# Patient Record
Sex: FEMALE | Race: OTHER | Employment: UNEMPLOYED | URBAN - METROPOLITAN AREA
[De-identification: names, ages, dates, MRNs, and addresses within clinical notes are randomized per-mention and may not be internally consistent; named-entity substitution may affect disease eponyms.]

---
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
## 2021-03-01 ENCOUNTER — HOSPITAL ENCOUNTER (OUTPATIENT)
Age: 21
Discharge: HOME | End: 2021-03-01

## 2021-03-01 VITALS
HEIGHT: 66 IN | SYSTOLIC BLOOD PRESSURE: 101 MMHG | DIASTOLIC BLOOD PRESSURE: 61 MMHG | HEART RATE: 98 BPM | WEIGHT: 124 LBS | TEMPERATURE: 98 F | OXYGEN SATURATION: 100 % | RESPIRATION RATE: 14 BRPM | BODY MASS INDEX: 19.93 KG/M2

## 2021-03-01 DIAGNOSIS — J03.90 ACUTE TONSILLITIS, UNSPECIFIED ETIOLOGY: Primary | ICD-10-CM

## 2021-03-01 LAB
#MXD IC: 1.6 X10ˆ3/UL (ref 0.1–1)
B-HCG UR QL: NEGATIVE
CREAT BLD-MCNC: 0.5 MG/DL
GLUCOSE BLD-MCNC: 93 MG/DL (ref 70–99)
HCT VFR BLD AUTO: 37.9 %
HGB BLD-MCNC: 12.6 G/DL
ISTAT BUN: 7 MG/DL (ref 7–18)
ISTAT CHLORIDE: 101 MMOL/L (ref 98–112)
ISTAT HEMATOCRIT: 41 %
ISTAT IONIZED CALCIUM FOR CHEM 8: 1.2 MMOL/L (ref 1.12–1.32)
ISTAT POTASSIUM: 3.3 MMOL/L (ref 3.6–5.1)
ISTAT SODIUM: 138 MMOL/L (ref 136–145)
ISTAT TCO2: 27 MMOL/L (ref 21–32)
LYMPHOCYTES # BLD AUTO: 1.4 X10ˆ3/UL (ref 1–4)
LYMPHOCYTES NFR BLD AUTO: 11.5 %
MCH RBC QN AUTO: 27.5 PG (ref 26–34)
MCHC RBC AUTO-ENTMCNC: 33.2 G/DL (ref 31–37)
MCV RBC AUTO: 82.8 FL (ref 80–100)
MIXED CELL %: 13.6 %
NEUTROPHILS # BLD AUTO: 9.1 X10ˆ3/UL (ref 1.5–7.7)
NEUTROPHILS NFR BLD AUTO: 74.9 %
PLATELET # BLD AUTO: 283 X10ˆ3/UL (ref 150–450)
RBC # BLD AUTO: 4.58 X10ˆ6/UL
S PYO AG THROAT QL: NEGATIVE
WBC # BLD AUTO: 12.1 X10ˆ3/UL (ref 4–11)

## 2021-03-01 PROCEDURE — 96365 THER/PROPH/DIAG IV INF INIT: CPT | Performed by: NURSE PRACTITIONER

## 2021-03-01 PROCEDURE — 99213 OFFICE O/P EST LOW 20 MIN: CPT | Performed by: NURSE PRACTITIONER

## 2021-03-01 PROCEDURE — 85025 COMPLETE CBC W/AUTO DIFF WBC: CPT | Performed by: NURSE PRACTITIONER

## 2021-03-01 PROCEDURE — 87081 CULTURE SCREEN ONLY: CPT | Performed by: NURSE PRACTITIONER

## 2021-03-01 PROCEDURE — 96375 TX/PRO/DX INJ NEW DRUG ADDON: CPT | Performed by: NURSE PRACTITIONER

## 2021-03-01 PROCEDURE — 36415 COLL VENOUS BLD VENIPUNCTURE: CPT | Performed by: NURSE PRACTITIONER

## 2021-03-01 PROCEDURE — S0077 INJECTION, CLINDAMYCIN PHOSP: HCPCS | Performed by: NURSE PRACTITIONER

## 2021-03-01 PROCEDURE — 81025 URINE PREGNANCY TEST: CPT | Performed by: NURSE PRACTITIONER

## 2021-03-01 PROCEDURE — 87880 STREP A ASSAY W/OPTIC: CPT | Performed by: NURSE PRACTITIONER

## 2021-03-01 PROCEDURE — 80047 BASIC METABLC PNL IONIZED CA: CPT | Performed by: NURSE PRACTITIONER

## 2021-03-01 RX ORDER — CLINDAMYCIN PHOSPHATE 600 MG/50ML
600 INJECTION INTRAVENOUS ONCE
Status: COMPLETED | OUTPATIENT
Start: 2021-03-01 | End: 2021-03-01

## 2021-03-01 RX ORDER — DEXAMETHASONE SODIUM PHOSPHATE 10 MG/ML
10 INJECTION, SOLUTION INTRAMUSCULAR; INTRAVENOUS ONCE
Status: COMPLETED | OUTPATIENT
Start: 2021-03-01 | End: 2021-03-01

## 2021-03-01 RX ORDER — SODIUM CHLORIDE 9 MG/ML
1000 INJECTION, SOLUTION INTRAVENOUS ONCE
Status: COMPLETED | OUTPATIENT
Start: 2021-03-01 | End: 2021-03-01

## 2021-03-01 RX ORDER — KETOROLAC TROMETHAMINE 30 MG/ML
15 INJECTION, SOLUTION INTRAMUSCULAR; INTRAVENOUS ONCE
Status: COMPLETED | OUTPATIENT
Start: 2021-03-01 | End: 2021-03-01

## 2021-03-01 RX ORDER — CLINDAMYCIN HYDROCHLORIDE 300 MG/1
300 CAPSULE ORAL 3 TIMES DAILY
Qty: 30 CAPSULE | Refills: 0 | Status: SHIPPED | OUTPATIENT
Start: 2021-03-01 | End: 2021-03-11

## 2021-03-01 NOTE — ED PROVIDER NOTES
Patient Seen in: [facility] [place]      History   Patient presents with:  Sore Throat    Stated Complaint: strep 1/20/20 sorethroat again neg strep sore throat again    HPI/Subjective:   HPI    This is a 26-year-old female who presents with a chief (Room air)       Current:/61   Pulse 98   Temp 98.4 °F (36.9 °C) (Temporal)   Resp 14   Ht 167.6 cm (5' 6\")   Wt 56.2 kg   SpO2 100%   BMI 20.01 kg/m²         Physical Exam  Vitals signs and nursing note reviewed.    Constitutional:       General: Sh Thought content normal.         Judgment: Judgment normal.       ED Course     Labs Reviewed   POCT CBC - Abnormal; Notable for the following components:       Result Value    WBC IC 12.1 (*)     # Neutrophil 9.1 (*)     # Mixed Cells 1.6 (*)     All other her discharge paper work for her. We also discussed supportive care. Pt and mother verbalized plan of care and states understanding. Case also discussed with Dr. Kevin Anguiano who agrees with the plan of care.      MDM  Number of Diagnoses or Management Options

## 2021-03-01 NOTE — ED INITIAL ASSESSMENT (HPI)
Patient with sore throat since the end of January. Had a video visit on 1/20 and was prescribed antibiotics for a possible strep throat infection.   Patient felt better while on the antibiotics but her sore throat returned 2 days after completing the cours

## 2021-03-02 ENCOUNTER — OFFICE VISIT (OUTPATIENT)
Dept: OTOLARYNGOLOGY | Facility: CLINIC | Age: 21
End: 2021-03-02

## 2021-03-02 VITALS
DIASTOLIC BLOOD PRESSURE: 60 MMHG | HEIGHT: 66 IN | BODY MASS INDEX: 19.93 KG/M2 | SYSTOLIC BLOOD PRESSURE: 90 MMHG | TEMPERATURE: 98 F | WEIGHT: 124 LBS

## 2021-03-02 DIAGNOSIS — J36 PERITONSILLAR ABSCESS: Primary | ICD-10-CM

## 2021-03-02 PROCEDURE — 3074F SYST BP LT 130 MM HG: CPT | Performed by: OTOLARYNGOLOGY

## 2021-03-02 PROCEDURE — 99203 OFFICE O/P NEW LOW 30 MIN: CPT | Performed by: OTOLARYNGOLOGY

## 2021-03-02 PROCEDURE — 3078F DIAST BP <80 MM HG: CPT | Performed by: OTOLARYNGOLOGY

## 2021-03-02 PROCEDURE — 3008F BODY MASS INDEX DOCD: CPT | Performed by: OTOLARYNGOLOGY

## 2021-03-02 RX ORDER — PREDNISONE 20 MG/1
TABLET ORAL
Qty: 6 TABLET | Refills: 0 | Status: SHIPPED | OUTPATIENT
Start: 2021-03-02

## 2021-03-02 NOTE — PROGRESS NOTES
Velvet Martinez is a 21year old female. Patient presents with: Tonsil Problem: IC follow up- acute tonsillitis- currently taking antibiotic    HPI:   She has been experiencing pain in her throat especially in the left side for the last 4 to 5 weeks.   She Palpation - Normal. Parotid gland - Normal. Thyroid gland - Normal.   Psychiatric Normal Orientation - Oriented to time, place, person & situation. Appropriate mood and affect. Lymph Detail Normal Submental. Submandibular.  Anterior cervical. Posterior ce

## 2021-03-10 ENCOUNTER — TELEPHONE (OUTPATIENT)
Dept: OTOLARYNGOLOGY | Facility: CLINIC | Age: 21
End: 2021-03-10

## 2021-10-30 ENCOUNTER — APPOINTMENT (OUTPATIENT)
Dept: CT IMAGING | Facility: HOSPITAL | Age: 21
End: 2021-10-30
Attending: EMERGENCY MEDICINE

## 2021-10-30 ENCOUNTER — HOSPITAL ENCOUNTER (EMERGENCY)
Facility: HOSPITAL | Age: 21
Discharge: HOME | End: 2021-10-30
Attending: EMERGENCY MEDICINE

## 2021-10-30 ENCOUNTER — HOSPITAL ENCOUNTER (OUTPATIENT)
Age: 21
Discharge: ANOTHER HOSPITAL | End: 2021-10-30

## 2021-10-30 VITALS
WEIGHT: 140 LBS | HEART RATE: 98 BPM | HEIGHT: 66 IN | SYSTOLIC BLOOD PRESSURE: 109 MMHG | TEMPERATURE: 99 F | BODY MASS INDEX: 22.5 KG/M2 | OXYGEN SATURATION: 98 % | DIASTOLIC BLOOD PRESSURE: 65 MMHG | RESPIRATION RATE: 18 BRPM

## 2021-10-30 VITALS
RESPIRATION RATE: 16 BRPM | HEIGHT: 66 IN | OXYGEN SATURATION: 100 % | DIASTOLIC BLOOD PRESSURE: 76 MMHG | SYSTOLIC BLOOD PRESSURE: 134 MMHG | BODY MASS INDEX: 22.5 KG/M2 | WEIGHT: 140 LBS | TEMPERATURE: 100 F | HEART RATE: 112 BPM

## 2021-10-30 DIAGNOSIS — R10.11 ABDOMINAL PAIN, RIGHT UPPER QUADRANT: Primary | ICD-10-CM

## 2021-10-30 DIAGNOSIS — N30.01 ACUTE CYSTITIS WITH HEMATURIA: ICD-10-CM

## 2021-10-30 DIAGNOSIS — N12 PYELONEPHRITIS: Primary | ICD-10-CM

## 2021-10-30 PROCEDURE — 87086 URINE CULTURE/COLONY COUNT: CPT | Performed by: NURSE PRACTITIONER

## 2021-10-30 PROCEDURE — 85025 COMPLETE CBC W/AUTO DIFF WBC: CPT | Performed by: EMERGENCY MEDICINE

## 2021-10-30 PROCEDURE — 99284 EMERGENCY DEPT VISIT MOD MDM: CPT

## 2021-10-30 PROCEDURE — 80048 BASIC METABOLIC PNL TOTAL CA: CPT | Performed by: EMERGENCY MEDICINE

## 2021-10-30 PROCEDURE — 80048 BASIC METABOLIC PNL TOTAL CA: CPT

## 2021-10-30 PROCEDURE — 99215 OFFICE O/P EST HI 40 MIN: CPT | Performed by: NURSE PRACTITIONER

## 2021-10-30 PROCEDURE — 96361 HYDRATE IV INFUSION ADD-ON: CPT

## 2021-10-30 PROCEDURE — 81002 URINALYSIS NONAUTO W/O SCOPE: CPT | Performed by: NURSE PRACTITIONER

## 2021-10-30 PROCEDURE — 87077 CULTURE AEROBIC IDENTIFY: CPT | Performed by: NURSE PRACTITIONER

## 2021-10-30 PROCEDURE — 87186 SC STD MICRODIL/AGAR DIL: CPT | Performed by: NURSE PRACTITIONER

## 2021-10-30 PROCEDURE — 81025 URINE PREGNANCY TEST: CPT | Performed by: NURSE PRACTITIONER

## 2021-10-30 PROCEDURE — 96365 THER/PROPH/DIAG IV INF INIT: CPT

## 2021-10-30 PROCEDURE — 96375 TX/PRO/DX INJ NEW DRUG ADDON: CPT

## 2021-10-30 PROCEDURE — 81001 URINALYSIS AUTO W/SCOPE: CPT | Performed by: EMERGENCY MEDICINE

## 2021-10-30 PROCEDURE — 96376 TX/PRO/DX INJ SAME DRUG ADON: CPT

## 2021-10-30 PROCEDURE — 85025 COMPLETE CBC W/AUTO DIFF WBC: CPT

## 2021-10-30 PROCEDURE — 74176 CT ABD & PELVIS W/O CONTRAST: CPT | Performed by: EMERGENCY MEDICINE

## 2021-10-30 RX ORDER — CEPHALEXIN 500 MG/1
500 CAPSULE ORAL 2 TIMES DAILY
Qty: 20 CAPSULE | Refills: 0 | Status: SHIPPED | OUTPATIENT
Start: 2021-10-30 | End: 2021-11-09

## 2021-10-30 RX ORDER — KETOROLAC TROMETHAMINE 15 MG/ML
15 INJECTION, SOLUTION INTRAMUSCULAR; INTRAVENOUS ONCE
Status: COMPLETED | OUTPATIENT
Start: 2021-10-30 | End: 2021-10-30

## 2021-10-30 RX ORDER — ACETAMINOPHEN 500 MG
1000 TABLET ORAL ONCE
Status: COMPLETED | OUTPATIENT
Start: 2021-10-30 | End: 2021-10-30

## 2021-10-30 RX ORDER — ONDANSETRON 4 MG/1
4 TABLET, ORALLY DISINTEGRATING ORAL EVERY 8 HOURS PRN
Qty: 30 TABLET | Refills: 0 | Status: SHIPPED | OUTPATIENT
Start: 2021-10-30 | End: 2021-11-06

## 2021-10-30 RX ORDER — KETOROLAC TROMETHAMINE 10 MG/1
10 TABLET, FILM COATED ORAL EVERY 6 HOURS PRN
Qty: 20 TABLET | Refills: 0 | Status: SHIPPED | OUTPATIENT
Start: 2021-10-30 | End: 2021-11-06

## 2021-10-30 NOTE — ED INITIAL ASSESSMENT (HPI)
Pt presents to the IC with c/o a fever, nausea, and back pain. Symptoms are all very generalized with nothing specific noted. Pt cannot remember any injury that caused the back pain.

## 2021-10-30 NOTE — ED QUICK NOTES
Tata Michael arrived through triage with her Mom for c/o R flank pain radiating to R sided abdomen with associated n/v and fever with chills for the past 3-4 days or so. Reports urinary frequency with some dysuria earlier in the week.  Denies vaginal discharge or

## 2021-10-30 NOTE — ED PROVIDER NOTES
Patient Seen in: [facility] AND [facility] Emergency Department      History   Patient presents with:  Back Pain  Fever  Nausea/Vomiting/Diarrhea    Stated Complaint: Pain in lower back    Subjective:   HPI    24year old female who reports a history of frequent heart sounds. No murmur heard. Pulmonary:      Effort: Pulmonary effort is normal. No respiratory distress. Breath sounds: Normal breath sounds. No wheezing. Abdominal:      General: There is no distension. Palpations: Abdomen is soft. ---------                               -----------         ------                     CBC W/ DIFFERENTIAL[[id number]]          Abnormal            Final result                 Please view results for these tests on the individual orders.    RAINBOW DRAW L admission but she declined, states she is feeling much better and would like to monitor symptoms at home. Patient understands reasons to return.   Given her history of frequent UTIs as a child and now again with prominent urinary tract infection I advised

## 2021-10-30 NOTE — ED PROVIDER NOTES
Patient Seen in: [facility]      History   Patient presents with:  Back Pain    Stated Complaint: ABDOMINAL PAIN FEVER BACK PAIN VOMITING    Subjective:   HPI    This is a 20-year-old female presenting with multiple vague complaints.   Patient Pharynx: Oropharynx is clear. Eyes:      Conjunctiva/sclera: Conjunctivae normal.   Cardiovascular:      Rate and Rhythm: Tachycardia present. Pulmonary:      Effort: Pulmonary effort is normal.      Breath sounds: Normal breath sounds.    Abdominal: debilitating disease or death. Patient states she understands the risk factors and will go to [facility] AND [facility] emergency department.     This patient was discussed with my attending Dr. Marilu Salazar who agrees with this providers management and transfer to the

## 2021-10-30 NOTE — ED INITIAL ASSESSMENT (HPI)
Right flank pain radiating to right side of abdomen x4 days. +Malodorous urine. Also c/o N/V/D x2 days, chills, and headaches x3 days. Took 1g tylenol around 1000.

## 2021-10-31 NOTE — ED QUICK NOTES
Dorinda Pleitez is tolerating sips of Gatorade, states she is feeling well enough to go home. Discharge instructions and prescriptions reviewed with Dorinda Pleitez and her Mom.  Encouraged to take Cephalexin as directed and follow-up with PMD. Return to ED for any new or wo

## 2022-05-02 NOTE — TELEPHONE ENCOUNTER
Dr Kitty Canavan patient stated last dose of antibiotic tomorrow but today throat discomfort is back again,,noticed red dots at the back of throat,tonsils are not swollen,no trouble swallowing,no fever ,advised pt to give it time for the medication to work ,if sym
I agree with advice given
Noted.
Pt states medication isn't working was  Working at first but now symptoms are starting to come back again like she never took medication before and this morning she woke up with red dots in the back of her throat please advise
OR RN

## 2024-07-14 ENCOUNTER — HOSPITAL ENCOUNTER (EMERGENCY)
Facility: HOSPITAL | Age: 24
Discharge: HOME | End: 2024-07-14
Attending: EMERGENCY MEDICINE

## 2024-07-14 ENCOUNTER — APPOINTMENT (OUTPATIENT)
Dept: CT IMAGING | Facility: HOSPITAL | Age: 24
End: 2024-07-14
Attending: EMERGENCY MEDICINE

## 2024-07-14 VITALS
OXYGEN SATURATION: 99 % | SYSTOLIC BLOOD PRESSURE: 115 MMHG | WEIGHT: 165 LBS | HEART RATE: 92 BPM | TEMPERATURE: 99 F | HEIGHT: 66 IN | DIASTOLIC BLOOD PRESSURE: 74 MMHG | BODY MASS INDEX: 26.52 KG/M2 | RESPIRATION RATE: 18 BRPM

## 2024-07-14 DIAGNOSIS — J36 TONSILLAR ABSCESS: Primary | ICD-10-CM

## 2024-07-14 LAB
ANION GAP SERPL CALC-SCNC: 6 MMOL/L (ref 0–18)
BASOPHILS # BLD AUTO: 0.05 X10(3) UL (ref 0–0.2)
BASOPHILS NFR BLD AUTO: 0.4 %
CALCIUM BLD-MCNC: 9.1 MG/DL (ref 8.7–10.4)
CHLORIDE SERPL-SCNC: 108 MMOL/L (ref 98–112)
CO2 SERPL-SCNC: 26 MMOL/L (ref 21–32)
CREAT BLD-MCNC: 0.62 MG/DL
DEPRECATED RDW RBC AUTO: 36.4 FL (ref 35.1–46.3)
EGFRCR SERPLBLD CKD-EPI 2021: 127 ML/MIN/1.73M2 (ref 60–?)
EOSINOPHIL # BLD AUTO: 0.02 X10(3) UL (ref 0–0.7)
EOSINOPHIL NFR BLD AUTO: 0.2 %
ERYTHROCYTE [DISTWIDTH] IN BLOOD BY AUTOMATED COUNT: 12.1 % (ref 11–15)
GLUCOSE BLD-MCNC: 88 MG/DL (ref 70–99)
HCT VFR BLD AUTO: 35.8 %
HGB BLD-MCNC: 12 G/DL
IMM GRANULOCYTES # BLD AUTO: 0.04 X10(3) UL (ref 0–1)
IMM GRANULOCYTES NFR BLD: 0.3 %
LYMPHOCYTES # BLD AUTO: 1.64 X10(3) UL (ref 1–4)
LYMPHOCYTES NFR BLD AUTO: 14.3 %
MCH RBC QN AUTO: 27.4 PG (ref 26–34)
MCHC RBC AUTO-ENTMCNC: 33.5 G/DL (ref 31–37)
MCV RBC AUTO: 81.7 FL
MONOCYTES # BLD AUTO: 0.94 X10(3) UL (ref 0.1–1)
MONOCYTES NFR BLD AUTO: 8.2 %
NEUTROPHILS # BLD AUTO: 8.75 X10 (3) UL (ref 1.5–7.7)
NEUTROPHILS # BLD AUTO: 8.75 X10(3) UL (ref 1.5–7.7)
NEUTROPHILS NFR BLD AUTO: 76.6 %
PLATELET # BLD AUTO: 271 10(3)UL (ref 150–450)
RBC # BLD AUTO: 4.38 X10(6)UL
SODIUM SERPL-SCNC: 140 MMOL/L (ref 136–145)
WBC # BLD AUTO: 11.4 X10(3) UL (ref 4–11)

## 2024-07-14 PROCEDURE — 42700 I&D ABSCESS PERITONSILLAR: CPT | Performed by: STUDENT IN AN ORGANIZED HEALTH CARE EDUCATION/TRAINING PROGRAM

## 2024-07-14 PROCEDURE — 70491 CT SOFT TISSUE NECK W/DYE: CPT | Performed by: EMERGENCY MEDICINE

## 2024-07-14 PROCEDURE — 99203 OFFICE O/P NEW LOW 30 MIN: CPT | Performed by: STUDENT IN AN ORGANIZED HEALTH CARE EDUCATION/TRAINING PROGRAM

## 2024-07-14 RX ORDER — METHYLPREDNISOLONE 4 MG/1
TABLET ORAL
Qty: 21 TABLET | Refills: 0 | Status: SHIPPED | OUTPATIENT
Start: 2024-07-14

## 2024-07-14 RX ORDER — AMOXICILLIN AND CLAVULANATE POTASSIUM 600; 42.9 MG/5ML; MG/5ML
900 POWDER, FOR SUSPENSION ORAL EVERY 12 HOURS
Qty: 160 ML | Refills: 0 | Status: SHIPPED | OUTPATIENT
Start: 2024-07-14 | End: 2024-07-24

## 2024-07-14 RX ORDER — LIDOCAINE HCL/EPINEPHRINE/PF 2%-1:200K
20 VIAL (ML) INJECTION ONCE
Status: COMPLETED | OUTPATIENT
Start: 2024-07-14 | End: 2024-07-14

## 2024-07-14 NOTE — ED QUICK NOTES
Pt independently walking. Friends at bedside. DC paperwork reviewed with pt; all questions answered. Pt stable at DC.

## 2024-07-14 NOTE — CONSULTS
[facility]  OTOLARYNGOLOGY - HEAD & NECK SURGERY    7/14/2024     Reason for Consultation:   Peritonsillar abscess    History of Present Illness:   Patient is a pleasant 24 year old female who is being seen for 6 days of throat pain, left-sided throat pressure, left ear pain and pressure, odynophagia.  The patient had gone to an outpatient clinic a few times last week was swabbed for strep and then sent home without antibiotics as her strep tests were negative.  She continued ibuprofen for pain but began to develop significant pain and pressure in the left side.  She states the right tonsil had subsided but the left 1 continue to get worse.  She was then started on penicillin approximately 3 days ago as well as prednisone.  She states that the penicillin did not help and she continued to worsen.  She then brought herself to the ER today for further evaluation and management.  She had a CT neck with contrast revealing a left peritonsillar abscess which was approximately 4.6 cm in largest dimension.  She did have a peritonsillar abscess once before approximately 2 years ago according to the patient.  She states she had to have this drained as well.  This was done at an outpatient clinic.    Past Medical History  No past medical history on file.    Past Surgical History  No past surgical history on file.    Family History  No family history on file.    Social History  Pediatric History   Patient Parents    Keyana Lilly (Mother)    Jammie Lillype (Father)     Other Topics Concern    Not on file   Social History Narrative    Not on file           Current Medications:  Current Outpatient Medications   Medication Sig Dispense Refill    amoxicillin-pot clavulanate 600-42.9 mg/5mL Oral Recon Susp Take 8 mL (960 mg total) by mouth every 12 (twelve) hours for 10 days. 160 mL 0    methylPREDNISolone 4 MG Oral Tablet Therapy Pack Take by oral route. 21 tablet 0    predniSONE 20 MG Oral Tab Take one tablet twice daily for 3  days (Patient not taking: Reported on 10/30/2021) 6 tablet 0       Allergies  No Known Allergies    Review of Systems:   A comprehensive 10 point review of systems was completed.  Pertinent positives and negatives noted in the the HPI.    Physical Exam:   Blood pressure 114/80, pulse 89, temperature 98.8 °F (37.1 °C), temperature source Temporal, resp. rate 18, height 5' 6\" (1.676 m), weight 165 lb (74.8 kg), SpO2 98%.    GENERAL: No acute distress, Comfortable appearing  FACE: HB 1/6, Normal Animation  HEAD: Normocephalic  EYES: EOMI, pupils equil  EARS: Bilateral Auricles Symmetric  NOSE: Nares patent bilaterally  ORAL CAVITY: Tongue mobile, Oropharynx with 3+ tonsils with significant fullness of the left tonsil and mild uvular deviation, minimal trismus, Floor of mouth clear, Posterior oropharynx normal    Procedure: Peritonsillar abscess drainage  After the patient was verbally consented the left anterior pillar was anesthetized using topical 20% benzocaine, followed by injection with 2% lidocaine with 1-100,000 epinephrine.  Adequate time was allowed for the anesthetic to take effect.  An 11 blade scalpel was used to make a stab incision along the anterior pillar.  Then using a 20-gauge needle and a 10 cc syringe the left peritonsillar space was entered and approximately 5 cc of sesar pus was drained.  This was sent for culture.  I again widened the incision to allow for maximal drainage.  The patient tolerated this without any issues.  Hemostasis was achieved.    Results:     Laboratory Data:  Lab Results   Component Value Date    WBC 11.4 (H) 07/14/2024    HGB 12.0 07/14/2024    HCT 35.8 07/14/2024    .0 07/14/2024    CREATSERUM 0.62 07/14/2024    BUN  07/14/2024      Comment:      Test not reported due to hemolysis.  Test reordered by laboratory.         07/14/2024    K  07/14/2024      Comment:      Test not reported due to hemolysis.  Test reordered by laboratory.         07/14/2024     CO2 26.0 07/14/2024    GLU 88 07/14/2024    CA 9.1 07/14/2024         Imaging:  CT SOFT TISSUE OF NECK(CONTRAST ONLY) (CPT=70491)    Result Date: 7/14/2024  PROCEDURE: CT SOFT TISSUE OF NECK (CONTRAST ONLY) (CPT=70491)  COMPARISON: None available.  INDICATIONS: Left-sided neck pain.  TECHNIQUE: Multidetector CT images were obtained through the neck soft tissues following the infusion of non-ionic intravenous contrast material. Automated exposure control for dose reduction was used. Adjustment of the mA and/or kV was done based on the  patient's size. Iterative reconstruction technique for dose reduction was employed. Dose information was transmitted to the ACR (American College of Radiology) NRDR (National Radiology Data Registry), which includes the Dose Index Registry.   FINDINGS:  NASO/OROPHARYNX: Asymmetric enlargement of the left tonsillar pillar is seen with expansile peripherally enhancing fluid collection measuring 2.6 x 2.7 x 4.6 cm. There is resultant mass effect exerted on the left aspect of the oropharyngeal airway. ORAL CAVITY/TONGUE: No visible mass or significant asymmetry.  COMPARTMENTS: The , parotid, carotid, retropharyngeal, and perivertebral spaces are without focal attenuation abnormality. The parapharyngeal fat planes are bilaterally symmetric without effacement. HYPOPHARYNX: No mass or other visible lesion.  LARYNX: No apparent vocal cord mass or asymmetry. NECK GLANDS: The parotid, submandibular, and thyroid glands are unremarkable.  LYMPH NODES: A few asymmetric left-sided borderline enlarged lymph nodes are seen, likely reactive.  VASCULATURE: Limited views are notable for normal-variant configuration of the thoracic aorta with four-vessel configuration; the left vertebral artery arises directly from the arch. SINUSES/MASTOIDS: Limited views show no significant fluid or mucosal thickening.  MEDIASTINUM: Limited views are notable for triangular soft tissue with predominantly  interspersed fat attenuation, likely reflecting residual thymus.  BONES: Cervical lordotic reversal may reflect paraspinal muscle spasm or patient positioning. OTHER: Visualized portions of the contrast enhanced cerebrum and cerebellum are grossly unremarkable. There is dependent subsegmental atelectasis of the visualized lungs bilaterally.          CONCLUSION:  1. Apparent abscess of the left tonsillar pillar measuring up to 4.6 cm in maximal dimension.   2. Borderline-sized left-sided cervical chain nodes are likely reactive.   3. Lesser incidental findings as above.    elm-remote.   Dictated by (CST): Chris Soto MD on 7/14/2024 at 9:06 AM     Finalized by (CST): Chris Soto MD on 7/14/2024 at 9:12 AM              Impression:   Left peritonsillar abscess  Odynophagia  Dysphagia    Recommendations:  I have sent the pus for aerobic and anaerobic culture.  There was approximately 4 to 5 cc which were aspirated.  I will have the patient switch to Augmentin suspension and Medrol Dosepak and she will see me early this week for follow-up to ensure continued improvement.  She is clear for discharge home from an ENT standpoint as long she is able to tolerate liquids.    Thank you for allowing me to participate in the care of your patient.    Gallo Richmond,    Otolaryngology/Rhinology, Sinus, and Endoscopic Skull Base Surgery  [facility]   [address] [place] [address] [address] [place] [id number]  Phone [phone]  Fax [phone]  7/14/2024  11:04 AM  7/14/2024

## 2024-07-14 NOTE — ED QUICK NOTES
Pt drinking AJ and tolerating well. Will continue to reassess. Suction at bedside, pt using as needed.

## 2024-07-14 NOTE — ED PROVIDER NOTES
Patient Seen in: [facility] Emergency Department      History     Chief Complaint   Patient presents with    Abscess    Ear Problem Pain     Stated Complaint: Absess    Subjective:   HPI    24-year-old female without significant past medical history presents with complaints of increased swelling and pain to the left side of her throat/tonsil.  She reports sore throat over the past 6 days.  She went to an immediate care the day of the onset and had a rapid strep test which came back negative.  She then returned to the immediate care 2 days later and again had a rapid strep test that was negative.  She was then called and told that the throat culture was positive and she was started on penicillin along with prednisone.  She reports continued pain and increased swelling to the left side of her throat along with pain to her left ear.  She reports low-grade fevers at home.  She denies any difficulty swallowing or breathing.    Objective:   No pertinent past medical history.            No pertinent past surgical history.              No pertinent social history.            Review of Systems    Positive for stated Chief Complaint: Abscess and Ear Problem Pain    Other systems are as noted in HPI.  Constitutional and vital signs reviewed.      All other systems reviewed and negative except as noted above.    Physical Exam     ED Triage Vitals [07/14/24 0510]   BP (!) 137/92   Pulse 102   Resp 18   Temp 98.8 °F (37.1 °C)   Temp src Temporal   SpO2 99 %   O2 Device None (Room air)       Current Vitals:   Vital Signs  BP: 114/80  Pulse: 89  Resp: 18  Temp: 98.8 °F (37.1 °C)  Temp src: Temporal  MAP (mmHg): 91    Oxygen Therapy  SpO2: 98 %  O2 Device: None (Room air)            Physical Exam    General Appearance: Well appearing  Eyes: pupils equal and round no injection  ENT: Bilateral TMs normal-appearing.  Oropharynx with mild erythema.  There is slightly increased swelling to the left tonsil.  No exudate  noted..  Respiratory: chest is non tender, lungs are clear to auscultation  Cardiac: regular rate and rhythm  Musculoskeletal: neck is supple and non tender         Extremities have full range of motion and are non tender  Skin: no rashes or lesions    DIFFERENTIAL DIAGNOSIS: After history and physical exam differential diagnosis was considered for tonsillitis, pharyngitis, peritonsillar abscess, or other      ED Course     Labs Reviewed   CBC W/ DIFFERENTIAL - Abnormal; Notable for the following components:       Result Value    WBC 11.4 (*)     Neutrophil Absolute Prelim 8.75 (*)     Neutrophil Absolute 8.75 (*)     All other components within normal limits   BASIC METABOLIC PANEL (8)   CBC WITH DIFFERENTIAL WITH PLATELET    Narrative:     The following orders were created for panel order CBC With Differential With Platelet.  Procedure                               Abnormality         Status                     ---------                               -----------         ------                     CBC W/ DIFFERENTIAL[[id number]]          Abnormal            Final result                 Please view results for these tests on the individual orders.   ANAEROBIC CULTURE   AEROBIC BACTERIAL CULTURE                    MDM      Lab and CT results noted.  Communicated with Dr. Richmond, otolaryngology.  He came to the ED and drained the tonsillar abscess in the ED at the bedside.  He prescribed Augmentin and a Medrol Dosepak for the patient.  He will follow-up with the patient in 2 days in his clinic.                                   Medical Decision Making      Disposition and Plan     Clinical Impression:  1. Tonsillar abscess         Disposition:  Discharge  7/14/2024 11:15 am    Follow-up:  Gallo Richmond DO  [address] [address]  [phone]    Follow up in 2 day(s)            Medications Prescribed:  Current Discharge Medication List        START taking these medications    Details    amoxicillin-pot clavulanate 600-42.9 mg/5mL Oral Recon Susp Take 8 mL (960 mg total) by mouth every 12 (twelve) hours for 10 days.  Qty: 160 mL, Refills: 0      methylPREDNISolone 4 MG Oral Tablet Therapy Pack Take by oral route.  Qty: 21 tablet, Refills: 0

## 2024-07-14 NOTE — ED INITIAL ASSESSMENT (HPI)
Pt recently Dx with Strep to ED with c/o pressure to tonsils, left ear pain, and abscess to left tonsil. Pt states resistance to penicillin which is her most recent prescription.

## 2024-07-14 NOTE — DISCHARGE INSTRUCTIONS
Take antibiotics and steroids as prescribed.  Take Tylenol or ibuprofen as needed for pain.  Follow-up with otolaryngology (ENT) on Tuesday as discussed.  Return to the emergency department if increased difficulty swallowing, difficulty breathing, or other new symptoms develop.

## 2024-07-15 ENCOUNTER — TELEPHONE (OUTPATIENT)
Dept: OTOLARYNGOLOGY | Facility: CLINIC | Age: 24
End: 2024-07-15

## 2024-07-15 NOTE — TELEPHONE ENCOUNTER
Pt called.  Seen at the emergency room 7-14-24 for tonsillar abscess.  Pt has scheduled an appointment tomorrow 7-16-24.  Pt is tasking blood in the mouth started 4 am on 7-15-24. Stuffy nose. Pt does not see blood.  Please call

## 2024-07-15 NOTE — TELEPHONE ENCOUNTER
Answered pt question, pt denies any SOB, difficulty swallowing and pt is not bleeding this morning. Per pt minor bleeding.For minor bleeding advised pt to use ice cold water in the back of the throat and spit out.  Advised that if bleeding reoccurs and increases, pt to go to ER. Pt verbalized understanding.

## 2024-07-16 ENCOUNTER — OFFICE VISIT (OUTPATIENT)
Facility: LOCATION | Age: 24
End: 2024-07-16

## 2024-07-16 VITALS — BODY MASS INDEX: 26.52 KG/M2 | WEIGHT: 165 LBS | HEIGHT: 66 IN

## 2024-07-16 DIAGNOSIS — J36 PERITONSILLAR ABSCESS: Primary | ICD-10-CM

## 2024-07-16 DIAGNOSIS — R13.10 ODYNOPHAGIA: ICD-10-CM

## 2024-07-16 DIAGNOSIS — R13.12 OROPHARYNGEAL DYSPHAGIA: ICD-10-CM

## 2024-07-16 PROCEDURE — 99213 OFFICE O/P EST LOW 20 MIN: CPT | Performed by: STUDENT IN AN ORGANIZED HEALTH CARE EDUCATION/TRAINING PROGRAM

## 2024-07-16 RX ORDER — PENICILLIN V POTASSIUM 500 MG/1
500 TABLET ORAL 2 TIMES DAILY
COMMUNITY
Start: 2024-07-13 | End: 2024-07-23

## 2024-07-16 RX ORDER — DOXYCYCLINE HYCLATE 100 MG/1
100 CAPSULE ORAL 2 TIMES DAILY
COMMUNITY
Start: 2024-07-15 | End: 2024-07-29

## 2024-07-16 RX ORDER — FLUTICASONE PROPIONATE 50 MCG
1 SPRAY, SUSPENSION (ML) NASAL DAILY
COMMUNITY
Start: 2024-07-12

## 2024-07-16 NOTE — PROGRESS NOTES
[facility]K SURGERY    7/16/2024     Reason for Consultation:   Peritonsillar abscess    History of Present Illness:   Patient is a pleasant 24 year old female who is being seen for 6 days of throat pain, left-sided throat pressure, left ear pain and pressure, odynophagia.  The patient had gone to an outpatient clinic a few times last week was swabbed for strep and then sent home without antibiotics as her strep tests were negative.  She continued ibuprofen for pain but began to develop significant pain and pressure in the left side.  She states the right tonsil had subsided but the left 1 continue to get worse.  She was then started on penicillin approximately 3 days ago as well as prednisone.  She states that the penicillin did not help and she continued to worsen.  She then brought herself to the ER today for further evaluation and management.  She had a CT neck with contrast revealing a left peritonsillar abscess which was approximately 4.6 cm in largest dimension.  She did have a peritonsillar abscess once before approximately 2 years ago according to the patient.  She states she had to have this drained as well.  This was done at an outpatient clinic.    INTERVAL HISTORY  7/16/2024: Patient returns today for re-evaluation after having PTA drained by me in ER 2 days ago. She is feeling much improved, voice normalizing. Able to eat and drink without an issue. This is her second PTA.    Past Medical History  No past medical history on file.    Past Surgical History  No past surgical history on file.    Family History  No family history on file.    Social History  Pediatric History   Patient Parents    Keyana Lilly (Mother)    Shilpa Lilly (Father)     Other Topics Concern    Not on file   Social History Narrative    Not on file           Current Medications:  Current Outpatient Medications   Medication Sig Dispense Refill    amoxicillin-pot clavulanate 600-42.9 mg/5mL Oral Recon  Susp Take 8 mL (960 mg total) by mouth every 12 (twelve) hours for 10 days. 160 mL 0    methylPREDNISolone 4 MG Oral Tablet Therapy Pack Take by oral route. 21 tablet 0    predniSONE 20 MG Oral Tab Take one tablet twice daily for 3 days (Patient not taking: Reported on 10/30/2021) 6 tablet 0       Allergies  No Known Allergies    Review of Systems:   A comprehensive 10 point review of systems was completed.  Pertinent positives and negatives noted in the the HPI.    Physical Exam:   There were no vitals taken for this visit.    GENERAL: No acute distress, Comfortable appearing  FACE: HB 1/6, Normal Animation  HEAD: Normocephalic  EYES: EOMI, pupils equil  EARS: Bilateral Auricles Symmetric  NOSE: Nares patent bilaterally  ORAL CAVITY: Tongue mobile, Oropharynx with 2+ tonsils today with normalization of the left tonsil and no further uvular deviation, no trismus, Floor of mouth clear, Posterior oropharynx normal    Procedure: Peritonsillar abscess drainage - As performed on initial consultation in ER  After the patient was verbally consented the left anterior pillar was anesthetized using topical 20% benzocaine, followed by injection with 2% lidocaine with 1-100,000 epinephrine.  Adequate time was allowed for the anesthetic to take effect.  An 11 blade scalpel was used to make a stab incision along the anterior pillar.  Then using a 20-gauge needle and a 10 cc syringe the left peritonsillar space was entered and approximately 5 cc of sesar pus was drained.  This was sent for culture.  I again widened the incision to allow for maximal drainage.  The patient tolerated this without any issues.  Hemostasis was achieved.    Results:     Laboratory Data:  Lab Results   Component Value Date    WBC 11.4 (H) 07/14/2024    HGB 12.0 07/14/2024    HCT 35.8 07/14/2024    .0 07/14/2024    CREATSERUM 0.62 07/14/2024    BUN  07/14/2024      Comment:      Test not reported due to hemolysis.  Test reordered by laboratory.          07/14/2024    K  07/14/2024      Comment:      Test not reported due to hemolysis.  Test reordered by laboratory.         07/14/2024    CO2 26.0 07/14/2024    GLU 88 07/14/2024    CA 9.1 07/14/2024         Imaging:  CT SOFT TISSUE OF NECK(CONTRAST ONLY) (CPT=70491)    Result Date: 7/14/2024  PROCEDURE: CT SOFT TISSUE OF NECK (CONTRAST ONLY) (CPT=70491)  COMPARISON: None available.  INDICATIONS: Left-sided neck pain.  TECHNIQUE: Multidetector CT images were obtained through the neck soft tissues following the infusion of non-ionic intravenous contrast material. Automated exposure control for dose reduction was used. Adjustment of the mA and/or kV was done based on the  patient's size. Iterative reconstruction technique for dose reduction was employed. Dose information was transmitted to the ACR (American College of Radiology) NRDR (National Radiology Data Registry), which includes the Dose Index Registry.   FINDINGS:  NASO/OROPHARYNX: Asymmetric enlargement of the left tonsillar pillar is seen with expansile peripherally enhancing fluid collection measuring 2.6 x 2.7 x 4.6 cm. There is resultant mass effect exerted on the left aspect of the oropharyngeal airway. ORAL CAVITY/TONGUE: No visible mass or significant asymmetry.  COMPARTMENTS: The , parotid, carotid, retropharyngeal, and perivertebral spaces are without focal attenuation abnormality. The parapharyngeal fat planes are bilaterally symmetric without effacement. HYPOPHARYNX: No mass or other visible lesion.  LARYNX: No apparent vocal cord mass or asymmetry. NECK GLANDS: The parotid, submandibular, and thyroid glands are unremarkable.  LYMPH NODES: A few asymmetric left-sided borderline enlarged lymph nodes are seen, likely reactive.  VASCULATURE: Limited views are notable for normal-variant configuration of the thoracic aorta with four-vessel configuration; the left vertebral artery arises directly from the arch.  SINUSES/MASTOIDS: Limited views show no significant fluid or mucosal thickening.  MEDIASTINUM: Limited views are notable for triangular soft tissue with predominantly interspersed fat attenuation, likely reflecting residual thymus.  BONES: Cervical lordotic reversal may reflect paraspinal muscle spasm or patient positioning. OTHER: Visualized portions of the contrast enhanced cerebrum and cerebellum are grossly unremarkable. There is dependent subsegmental atelectasis of the visualized lungs bilaterally.          CONCLUSION:  1. Apparent abscess of the left tonsillar pillar measuring up to 4.6 cm in maximal dimension.   2. Borderline-sized left-sided cervical chain nodes are likely reactive.   3. Lesser incidental findings as above.    elm-remote.   Dictated by (CST): Chris Soto MD on 7/14/2024 at 9:06 AM     Finalized by (CST): Chris Soto MD on 7/14/2024 at 9:12 AM              Impression:   Left peritonsillar abscess  Odynophagia  Dysphagia    Recommendations:  The patient is much improved after having her peritonsillar abscess drained in the ER, and after taking Augmentin and Medrol.  This was her second peritonsillar abscess.  We did discuss tonsillectomy.  The other option is to continue to watch and wait, and to be vigilant about treating strep earlier in the future.  The patient has had symptoms for almost a week prior to initiating therapy, and was undertreated with penicillin.  She will reach out to me via [employer] if she wishes to pursue tonsillectomy.    Thank you for allowing me to participate in the care of your patient.    Gallo Richmond,    Otolaryngology/Rhinology, Sinus, and Endoscopic Skull Base Surgery  [facility] [address] [place] [id number]  Phone [phone]  Fax [phone]  7/14/2024  11:04 AM  7/16/2024

## 2024-07-19 NOTE — PROGRESS NOTES
ED Culture Callback Results Review    Pharmacist reviewed culture results from ED visit .    Final wound culture positive for strep intermedius. Patient was prescribed Amoxicillin-Clavulanate (Augmentin) on discharge. Current therapy is appropriate based on reported susceptibilities. No further intervention required at this time.      Genna Guerrero, Harry  Emergency Medicine Pharmacist Specialist  07/19/24; 1:44 PM

## 2024-09-26 ENCOUNTER — TELEPHONE (OUTPATIENT)
Dept: UROLOGY | Age: 24
End: 2024-09-26

## (undated) NOTE — LETTER
Date & Time: 5/13/2018, 6:05 PM  Patient: Dina Henson  Encounter Provider(s):    Marissa Robertson MD       To Whom It May Concern:    Dina Henson was seen and treated in our department on 5/13/2018.  She can return to school if she is fever free

## (undated) NOTE — LETTER
October 17, 2017    Patient: Maisha Guerrero   Date of Visit: 10/17/2017       To Whom It May Concern:    Maisha Guerrero was seen and treated in our emergency department on 10/17/2017. She can return to work on October 18th.     If you have any questions

## (undated) NOTE — ED AVS SNAPSHOT
Ar Velarde   MRN: [id number]    Department:  [facility] Emergency Department   Date of Visit:  10/17/2017           Disclosure     Insurance plans vary and the physician(s) referred by the ER may not be covered by your plan.  Please contact CARE PHYSICIAN AT ONCE OR RETURN IMMEDIATELY TO THE EMERGENCY DEPARTMENT. If you have been prescribed any medication(s), please fill your prescription right away and begin taking the medication(s) as directed.   If you believe that any of the medications